# Patient Record
Sex: FEMALE | Race: WHITE | NOT HISPANIC OR LATINO | Employment: OTHER | URBAN - METROPOLITAN AREA
[De-identification: names, ages, dates, MRNs, and addresses within clinical notes are randomized per-mention and may not be internally consistent; named-entity substitution may affect disease eponyms.]

---

## 2019-12-01 ENCOUNTER — HOSPITAL ENCOUNTER (OUTPATIENT)
Facility: HOSPITAL | Age: 84
Setting detail: OBSERVATION
Discharge: HOME | End: 2019-12-03
Attending: EMERGENCY MEDICINE | Admitting: INTERNAL MEDICINE

## 2019-12-01 ENCOUNTER — APPOINTMENT (OUTPATIENT)
Dept: GENERAL RADIOLOGY | Facility: HOSPITAL | Age: 84
End: 2019-12-01

## 2019-12-01 ENCOUNTER — APPOINTMENT (OUTPATIENT)
Dept: CT IMAGING | Facility: HOSPITAL | Age: 84
End: 2019-12-01

## 2019-12-01 DIAGNOSIS — R53.1 WEAKNESS: ICD-10-CM

## 2019-12-01 DIAGNOSIS — R50.9 FEVER, UNSPECIFIED FEVER CAUSE: Primary | ICD-10-CM

## 2019-12-01 PROBLEM — E03.9 HYPOTHYROIDISM (ACQUIRED): Status: ACTIVE | Noted: 2019-12-01

## 2019-12-01 PROBLEM — R41.0 CONFUSION: Status: ACTIVE | Noted: 2019-12-01

## 2019-12-01 PROBLEM — I10 ESSENTIAL HYPERTENSION: Status: ACTIVE | Noted: 2019-12-01

## 2019-12-01 LAB
ALBUMIN SERPL-MCNC: 3.7 G/DL (ref 3.5–5.2)
ALBUMIN/GLOB SERPL: 0.9 G/DL
ALP SERPL-CCNC: 72 U/L (ref 39–117)
ALT SERPL W P-5'-P-CCNC: 9 U/L (ref 1–33)
ANION GAP SERPL CALCULATED.3IONS-SCNC: 14 MMOL/L (ref 5–15)
AST SERPL-CCNC: 30 U/L (ref 1–32)
BACTERIA UR QL AUTO: ABNORMAL /HPF
BASOPHILS # BLD AUTO: 0 10*3/MM3 (ref 0–0.2)
BASOPHILS NFR BLD AUTO: 0.3 % (ref 0–1.5)
BILIRUB SERPL-MCNC: 0.5 MG/DL (ref 0.2–1.2)
BILIRUB UR QL STRIP: NEGATIVE
BUN BLD-MCNC: 30 MG/DL (ref 8–23)
BUN/CREAT SERPL: 19.9 (ref 7–25)
CALCIUM SPEC-SCNC: 9.7 MG/DL (ref 8.2–9.6)
CHLORIDE SERPL-SCNC: 106 MMOL/L (ref 98–107)
CLARITY UR: CLEAR
CO2 SERPL-SCNC: 18 MMOL/L (ref 22–29)
COLOR UR: YELLOW
CREAT BLD-MCNC: 1.51 MG/DL (ref 0.57–1)
D-LACTATE SERPL-SCNC: 1.8 MMOL/L (ref 0.5–2)
DEPRECATED RDW RBC AUTO: 45.1 FL (ref 37–54)
EOSINOPHIL # BLD AUTO: 0 10*3/MM3 (ref 0–0.4)
EOSINOPHIL NFR BLD AUTO: 0.2 % (ref 0.3–6.2)
ERYTHROCYTE [DISTWIDTH] IN BLOOD BY AUTOMATED COUNT: 14.3 % (ref 12.3–15.4)
FLUAV SUBTYP SPEC NAA+PROBE: NOT DETECTED
FLUBV RNA ISLT QL NAA+PROBE: NOT DETECTED
GFR SERPL CREATININE-BSD FRML MDRD: 32 ML/MIN/1.73
GLOBULIN UR ELPH-MCNC: 4 GM/DL
GLUCOSE BLD-MCNC: 84 MG/DL (ref 65–99)
GLUCOSE UR STRIP-MCNC: NEGATIVE MG/DL
HCT VFR BLD AUTO: 33.1 % (ref 34–46.6)
HGB BLD-MCNC: 11 G/DL (ref 12–15.9)
HGB UR QL STRIP.AUTO: ABNORMAL
HYALINE CASTS UR QL AUTO: ABNORMAL /LPF
KETONES UR QL STRIP: ABNORMAL
LEUKOCYTE ESTERASE UR QL STRIP.AUTO: NEGATIVE
LIPASE SERPL-CCNC: 16 U/L (ref 13–60)
LYMPHOCYTES # BLD AUTO: 0.8 10*3/MM3 (ref 0.7–3.1)
LYMPHOCYTES NFR BLD AUTO: 19.8 % (ref 19.6–45.3)
MCH RBC QN AUTO: 29.1 PG (ref 26.6–33)
MCHC RBC AUTO-ENTMCNC: 33.2 G/DL (ref 31.5–35.7)
MCV RBC AUTO: 87.7 FL (ref 79–97)
MONOCYTES # BLD AUTO: 0.2 10*3/MM3 (ref 0.1–0.9)
MONOCYTES NFR BLD AUTO: 4.3 % (ref 5–12)
NEUTROPHILS # BLD AUTO: 3 10*3/MM3 (ref 1.7–7)
NEUTROPHILS NFR BLD AUTO: 75.4 % (ref 42.7–76)
NITRITE UR QL STRIP: NEGATIVE
NRBC BLD AUTO-RTO: 0 /100 WBC (ref 0–0.2)
PH UR STRIP.AUTO: 7 [PH] (ref 5–8)
PLATELET # BLD AUTO: 175 10*3/MM3 (ref 140–450)
PMV BLD AUTO: 8.8 FL (ref 6–12)
POTASSIUM BLD-SCNC: 4.6 MMOL/L (ref 3.5–5.2)
PROT SERPL-MCNC: 7.7 G/DL (ref 6–8.5)
PROT UR QL STRIP: ABNORMAL
RBC # BLD AUTO: 3.78 10*6/MM3 (ref 3.77–5.28)
RBC # UR: ABNORMAL /HPF
REF LAB TEST METHOD: ABNORMAL
SODIUM BLD-SCNC: 138 MMOL/L (ref 136–145)
SP GR UR STRIP: 1.02 (ref 1–1.03)
SQUAMOUS #/AREA URNS HPF: ABNORMAL /HPF
TROPONIN T SERPL-MCNC: 0.03 NG/ML (ref 0–0.03)
UROBILINOGEN UR QL STRIP: ABNORMAL
WBC NRBC COR # BLD: 4 10*3/MM3 (ref 3.4–10.8)
WBC UR QL AUTO: ABNORMAL /HPF

## 2019-12-01 PROCEDURE — P9612 CATHETERIZE FOR URINE SPEC: HCPCS

## 2019-12-01 PROCEDURE — 93005 ELECTROCARDIOGRAM TRACING: CPT | Performed by: EMERGENCY MEDICINE

## 2019-12-01 PROCEDURE — 83690 ASSAY OF LIPASE: CPT | Performed by: EMERGENCY MEDICINE

## 2019-12-01 PROCEDURE — 25010000002 MORPHINE PER 10 MG: Performed by: EMERGENCY MEDICINE

## 2019-12-01 PROCEDURE — 99284 EMERGENCY DEPT VISIT MOD MDM: CPT

## 2019-12-01 PROCEDURE — 81001 URINALYSIS AUTO W/SCOPE: CPT | Performed by: EMERGENCY MEDICINE

## 2019-12-01 PROCEDURE — 70450 CT HEAD/BRAIN W/O DYE: CPT

## 2019-12-01 PROCEDURE — 71045 X-RAY EXAM CHEST 1 VIEW: CPT

## 2019-12-01 PROCEDURE — 85025 COMPLETE CBC W/AUTO DIFF WBC: CPT | Performed by: EMERGENCY MEDICINE

## 2019-12-01 PROCEDURE — G0378 HOSPITAL OBSERVATION PER HR: HCPCS

## 2019-12-01 PROCEDURE — 84145 PROCALCITONIN (PCT): CPT | Performed by: PHYSICIAN ASSISTANT

## 2019-12-01 PROCEDURE — 99219 PR INITIAL OBSERVATION CARE/DAY 50 MINUTES: CPT | Performed by: PHYSICIAN ASSISTANT

## 2019-12-01 PROCEDURE — 36415 COLL VENOUS BLD VENIPUNCTURE: CPT

## 2019-12-01 PROCEDURE — 83605 ASSAY OF LACTIC ACID: CPT

## 2019-12-01 PROCEDURE — 83880 ASSAY OF NATRIURETIC PEPTIDE: CPT | Performed by: PHYSICIAN ASSISTANT

## 2019-12-01 PROCEDURE — 87040 BLOOD CULTURE FOR BACTERIA: CPT | Performed by: EMERGENCY MEDICINE

## 2019-12-01 PROCEDURE — 84484 ASSAY OF TROPONIN QUANT: CPT | Performed by: EMERGENCY MEDICINE

## 2019-12-01 PROCEDURE — 25010000002 ONDANSETRON PER 1 MG: Performed by: EMERGENCY MEDICINE

## 2019-12-01 PROCEDURE — 25010000002 CEFTRIAXONE PER 250 MG: Performed by: EMERGENCY MEDICINE

## 2019-12-01 PROCEDURE — 80053 COMPREHEN METABOLIC PANEL: CPT | Performed by: EMERGENCY MEDICINE

## 2019-12-01 PROCEDURE — 96375 TX/PRO/DX INJ NEW DRUG ADDON: CPT

## 2019-12-01 PROCEDURE — 84443 ASSAY THYROID STIM HORMONE: CPT | Performed by: PHYSICIAN ASSISTANT

## 2019-12-01 PROCEDURE — 87502 INFLUENZA DNA AMP PROBE: CPT | Performed by: EMERGENCY MEDICINE

## 2019-12-01 RX ORDER — MAGNESIUM SULFATE HEPTAHYDRATE 40 MG/ML
4 INJECTION, SOLUTION INTRAVENOUS AS NEEDED
Status: DISCONTINUED | OUTPATIENT
Start: 2019-12-01 | End: 2019-12-03 | Stop reason: HOSPADM

## 2019-12-01 RX ORDER — NIFEDIPINE 30 MG/1
30 TABLET, EXTENDED RELEASE ORAL DAILY
Status: CANCELLED | OUTPATIENT
Start: 2019-12-02

## 2019-12-01 RX ORDER — ASPIRIN 81 MG/1
81 TABLET ORAL DAILY
COMMUNITY

## 2019-12-01 RX ORDER — NITROGLYCERIN 0.4 MG/1
0.4 TABLET SUBLINGUAL
Status: DISCONTINUED | OUTPATIENT
Start: 2019-12-01 | End: 2019-12-03 | Stop reason: HOSPADM

## 2019-12-01 RX ORDER — POTASSIUM CHLORIDE 1.5 G/1.77G
40 POWDER, FOR SOLUTION ORAL AS NEEDED
Status: CANCELLED | OUTPATIENT
Start: 2019-12-01

## 2019-12-01 RX ORDER — ONDANSETRON 2 MG/ML
4 INJECTION INTRAMUSCULAR; INTRAVENOUS EVERY 6 HOURS PRN
Status: DISCONTINUED | OUTPATIENT
Start: 2019-12-01 | End: 2019-12-03 | Stop reason: HOSPADM

## 2019-12-01 RX ORDER — ACETAMINOPHEN 650 MG/1
650 SUPPOSITORY RECTAL EVERY 4 HOURS PRN
Status: CANCELLED | OUTPATIENT
Start: 2019-12-01

## 2019-12-01 RX ORDER — POTASSIUM CHLORIDE 20 MEQ/1
40 TABLET, EXTENDED RELEASE ORAL AS NEEDED
Status: CANCELLED | OUTPATIENT
Start: 2019-12-01

## 2019-12-01 RX ORDER — ONDANSETRON 2 MG/ML
4 INJECTION INTRAMUSCULAR; INTRAVENOUS EVERY 6 HOURS PRN
Status: CANCELLED | OUTPATIENT
Start: 2019-12-01

## 2019-12-01 RX ORDER — LEVOTHYROXINE SODIUM 0.07 MG/1
75 TABLET ORAL DAILY
COMMUNITY

## 2019-12-01 RX ORDER — MAGNESIUM SULFATE HEPTAHYDRATE 40 MG/ML
2 INJECTION, SOLUTION INTRAVENOUS AS NEEDED
Status: CANCELLED | OUTPATIENT
Start: 2019-12-01

## 2019-12-01 RX ORDER — ACETAMINOPHEN 160 MG/5ML
650 SOLUTION ORAL EVERY 4 HOURS PRN
Status: DISCONTINUED | OUTPATIENT
Start: 2019-12-01 | End: 2019-12-03 | Stop reason: HOSPADM

## 2019-12-01 RX ORDER — ASPIRIN 81 MG/1
81 TABLET ORAL DAILY
Status: DISCONTINUED | OUTPATIENT
Start: 2019-12-02 | End: 2019-12-03 | Stop reason: HOSPADM

## 2019-12-01 RX ORDER — DOCUSATE SODIUM 100 MG/1
100 CAPSULE, LIQUID FILLED ORAL 2 TIMES DAILY PRN
Status: DISCONTINUED | OUTPATIENT
Start: 2019-12-01 | End: 2019-12-03 | Stop reason: HOSPADM

## 2019-12-01 RX ORDER — SODIUM CHLORIDE 0.9 % (FLUSH) 0.9 %
10 SYRINGE (ML) INJECTION EVERY 12 HOURS SCHEDULED
Status: DISCONTINUED | OUTPATIENT
Start: 2019-12-01 | End: 2019-12-03 | Stop reason: HOSPADM

## 2019-12-01 RX ORDER — NICOTINE 21 MG/24HR
1 PATCH, TRANSDERMAL 24 HOURS TRANSDERMAL EVERY 24 HOURS
Status: CANCELLED | OUTPATIENT
Start: 2019-12-01

## 2019-12-01 RX ORDER — ONDANSETRON 4 MG/1
4 TABLET, FILM COATED ORAL EVERY 6 HOURS PRN
Status: DISCONTINUED | OUTPATIENT
Start: 2019-12-01 | End: 2019-12-03 | Stop reason: HOSPADM

## 2019-12-01 RX ORDER — CHOLECALCIFEROL (VITAMIN D3) 125 MCG
5 CAPSULE ORAL NIGHTLY PRN
Status: CANCELLED | OUTPATIENT
Start: 2019-12-01

## 2019-12-01 RX ORDER — ONDANSETRON 4 MG/1
4 TABLET, FILM COATED ORAL EVERY 6 HOURS PRN
Status: CANCELLED | OUTPATIENT
Start: 2019-12-01

## 2019-12-01 RX ORDER — DOCUSATE SODIUM 100 MG/1
100 CAPSULE, LIQUID FILLED ORAL 2 TIMES DAILY PRN
Status: CANCELLED | OUTPATIENT
Start: 2019-12-01

## 2019-12-01 RX ORDER — POTASSIUM CHLORIDE 20 MEQ/1
40 TABLET, EXTENDED RELEASE ORAL AS NEEDED
Status: DISCONTINUED | OUTPATIENT
Start: 2019-12-01 | End: 2019-12-03 | Stop reason: HOSPADM

## 2019-12-01 RX ORDER — MAGNESIUM SULFATE HEPTAHYDRATE 40 MG/ML
2 INJECTION, SOLUTION INTRAVENOUS AS NEEDED
Status: DISCONTINUED | OUTPATIENT
Start: 2019-12-01 | End: 2019-12-03 | Stop reason: HOSPADM

## 2019-12-01 RX ORDER — ASPIRIN 81 MG/1
81 TABLET ORAL DAILY
Status: CANCELLED | OUTPATIENT
Start: 2019-12-02

## 2019-12-01 RX ORDER — ONDANSETRON 2 MG/ML
4 INJECTION INTRAMUSCULAR; INTRAVENOUS ONCE
Status: COMPLETED | OUTPATIENT
Start: 2019-12-01 | End: 2019-12-01

## 2019-12-01 RX ORDER — SODIUM CHLORIDE 0.9 % (FLUSH) 0.9 %
10 SYRINGE (ML) INJECTION EVERY 12 HOURS SCHEDULED
Status: CANCELLED | OUTPATIENT
Start: 2019-12-01

## 2019-12-01 RX ORDER — ACETAMINOPHEN 160 MG/5ML
650 SOLUTION ORAL EVERY 4 HOURS PRN
Status: CANCELLED | OUTPATIENT
Start: 2019-12-01

## 2019-12-01 RX ORDER — MAGNESIUM SULFATE HEPTAHYDRATE 40 MG/ML
4 INJECTION, SOLUTION INTRAVENOUS AS NEEDED
Status: CANCELLED | OUTPATIENT
Start: 2019-12-01

## 2019-12-01 RX ORDER — NIFEDIPINE 30 MG/1
30 TABLET, EXTENDED RELEASE ORAL DAILY
COMMUNITY

## 2019-12-01 RX ORDER — ACETAMINOPHEN 325 MG/1
650 TABLET ORAL EVERY 4 HOURS PRN
Status: DISCONTINUED | OUTPATIENT
Start: 2019-12-01 | End: 2019-12-03 | Stop reason: HOSPADM

## 2019-12-01 RX ORDER — ACETAMINOPHEN 650 MG/1
650 SUPPOSITORY RECTAL EVERY 4 HOURS PRN
Status: DISCONTINUED | OUTPATIENT
Start: 2019-12-01 | End: 2019-12-03 | Stop reason: HOSPADM

## 2019-12-01 RX ORDER — CHOLECALCIFEROL (VITAMIN D3) 125 MCG
5 CAPSULE ORAL NIGHTLY PRN
Status: DISCONTINUED | OUTPATIENT
Start: 2019-12-01 | End: 2019-12-03 | Stop reason: HOSPADM

## 2019-12-01 RX ORDER — POTASSIUM CHLORIDE 1.5 G/1.77G
40 POWDER, FOR SOLUTION ORAL AS NEEDED
Status: DISCONTINUED | OUTPATIENT
Start: 2019-12-01 | End: 2019-12-03 | Stop reason: HOSPADM

## 2019-12-01 RX ORDER — NITROGLYCERIN 0.4 MG/1
0.4 TABLET SUBLINGUAL
Status: CANCELLED | OUTPATIENT
Start: 2019-12-01

## 2019-12-01 RX ORDER — CALCIUM GLUCONATE 20 MG/ML
2 INJECTION, SOLUTION INTRAVENOUS AS NEEDED
Status: CANCELLED | OUTPATIENT
Start: 2019-12-01

## 2019-12-01 RX ORDER — CALCIUM GLUCONATE 20 MG/ML
2 INJECTION, SOLUTION INTRAVENOUS AS NEEDED
Status: DISCONTINUED | OUTPATIENT
Start: 2019-12-01 | End: 2019-12-03 | Stop reason: HOSPADM

## 2019-12-01 RX ORDER — ACETAMINOPHEN 325 MG/1
650 TABLET ORAL EVERY 4 HOURS PRN
Status: CANCELLED | OUTPATIENT
Start: 2019-12-01

## 2019-12-01 RX ORDER — SODIUM CHLORIDE 0.9 % (FLUSH) 0.9 %
10 SYRINGE (ML) INJECTION AS NEEDED
Status: CANCELLED | OUTPATIENT
Start: 2019-12-01

## 2019-12-01 RX ORDER — SODIUM CHLORIDE 0.9 % (FLUSH) 0.9 %
10 SYRINGE (ML) INJECTION AS NEEDED
Status: DISCONTINUED | OUTPATIENT
Start: 2019-12-01 | End: 2019-12-03 | Stop reason: HOSPADM

## 2019-12-01 RX ORDER — NIFEDIPINE 30 MG/1
30 TABLET, EXTENDED RELEASE ORAL DAILY
Status: DISCONTINUED | OUTPATIENT
Start: 2019-12-02 | End: 2019-12-03 | Stop reason: HOSPADM

## 2019-12-01 RX ORDER — CALCIUM GLUCONATE 20 MG/ML
1 INJECTION, SOLUTION INTRAVENOUS AS NEEDED
Status: DISCONTINUED | OUTPATIENT
Start: 2019-12-01 | End: 2019-12-03 | Stop reason: HOSPADM

## 2019-12-01 RX ORDER — LEVOTHYROXINE SODIUM 0.07 MG/1
75 TABLET ORAL
Status: DISCONTINUED | OUTPATIENT
Start: 2019-12-02 | End: 2019-12-03 | Stop reason: HOSPADM

## 2019-12-01 RX ORDER — NICOTINE 21 MG/24HR
1 PATCH, TRANSDERMAL 24 HOURS TRANSDERMAL EVERY 24 HOURS
Status: DISCONTINUED | OUTPATIENT
Start: 2019-12-02 | End: 2019-12-03 | Stop reason: HOSPADM

## 2019-12-01 RX ORDER — CALCIUM GLUCONATE 20 MG/ML
1 INJECTION, SOLUTION INTRAVENOUS AS NEEDED
Status: CANCELLED | OUTPATIENT
Start: 2019-12-01

## 2019-12-01 RX ORDER — LEVOTHYROXINE SODIUM 0.07 MG/1
75 TABLET ORAL DAILY
Status: CANCELLED | OUTPATIENT
Start: 2019-12-02

## 2019-12-01 RX ORDER — MORPHINE SULFATE 4 MG/ML
2 INJECTION, SOLUTION INTRAMUSCULAR; INTRAVENOUS ONCE
Status: COMPLETED | OUTPATIENT
Start: 2019-12-01 | End: 2019-12-01

## 2019-12-01 RX ADMIN — SODIUM CHLORIDE 500 ML: 900 INJECTION, SOLUTION INTRAVENOUS at 20:22

## 2019-12-01 RX ADMIN — ONDANSETRON 4 MG: 2 INJECTION INTRAMUSCULAR; INTRAVENOUS at 20:48

## 2019-12-01 RX ADMIN — CEFTRIAXONE SODIUM 1 G: 10 INJECTION, POWDER, FOR SOLUTION INTRAVENOUS at 20:49

## 2019-12-01 RX ADMIN — MORPHINE SULFATE 2 MG: 4 INJECTION INTRAVENOUS at 20:49

## 2019-12-02 PROBLEM — N17.9 ACUTE KIDNEY INJURY (HCC): Status: ACTIVE | Noted: 2019-12-02

## 2019-12-02 LAB
ALBUMIN SERPL-MCNC: 3.1 G/DL (ref 3.5–5.2)
ALBUMIN/GLOB SERPL: 0.9 G/DL
ALP SERPL-CCNC: 59 U/L (ref 39–117)
ALT SERPL W P-5'-P-CCNC: 8 U/L (ref 1–33)
ANION GAP SERPL CALCULATED.3IONS-SCNC: 7 MMOL/L (ref 5–15)
AST SERPL-CCNC: 26 U/L (ref 1–32)
B PERT DNA SPEC QL NAA+PROBE: NOT DETECTED
BASOPHILS # BLD AUTO: 0 10*3/MM3 (ref 0–0.2)
BASOPHILS NFR BLD AUTO: 0.4 % (ref 0–1.5)
BILIRUB SERPL-MCNC: 0.2 MG/DL (ref 0.2–1.2)
BUN BLD-MCNC: 28 MG/DL (ref 8–23)
BUN/CREAT SERPL: 19.9 (ref 7–25)
C PNEUM DNA NPH QL NAA+NON-PROBE: NOT DETECTED
CALCIUM SPEC-SCNC: 9.1 MG/DL (ref 8.2–9.6)
CHLORIDE SERPL-SCNC: 111 MMOL/L (ref 98–107)
CHOLEST SERPL-MCNC: 145 MG/DL (ref 0–200)
CK SERPL-CCNC: 37 U/L (ref 20–180)
CO2 SERPL-SCNC: 22 MMOL/L (ref 22–29)
CREAT BLD-MCNC: 1.41 MG/DL (ref 0.57–1)
DEPRECATED RDW RBC AUTO: 46.8 FL (ref 37–54)
EOSINOPHIL # BLD AUTO: 0 10*3/MM3 (ref 0–0.4)
EOSINOPHIL NFR BLD AUTO: 0.7 % (ref 0.3–6.2)
ERYTHROCYTE [DISTWIDTH] IN BLOOD BY AUTOMATED COUNT: 14.7 % (ref 12.3–15.4)
FERRITIN SERPL-MCNC: 145.4 NG/ML (ref 13–150)
FLUAV H1 2009 PAND RNA NPH QL NAA+PROBE: NOT DETECTED
FLUAV H1 HA GENE NPH QL NAA+PROBE: NOT DETECTED
FLUAV H3 RNA NPH QL NAA+PROBE: NOT DETECTED
FLUAV SUBTYP SPEC NAA+PROBE: NOT DETECTED
FLUBV RNA ISLT QL NAA+PROBE: NOT DETECTED
GFR SERPL CREATININE-BSD FRML MDRD: 34 ML/MIN/1.73
GLOBULIN UR ELPH-MCNC: 3.3 GM/DL
GLUCOSE BLD-MCNC: 74 MG/DL (ref 65–99)
HADV DNA SPEC NAA+PROBE: NOT DETECTED
HBA1C MFR BLD: 4.7 % (ref 3.5–5.6)
HCOV 229E RNA SPEC QL NAA+PROBE: NOT DETECTED
HCOV HKU1 RNA SPEC QL NAA+PROBE: NOT DETECTED
HCOV NL63 RNA SPEC QL NAA+PROBE: NOT DETECTED
HCOV OC43 RNA SPEC QL NAA+PROBE: NOT DETECTED
HCT VFR BLD AUTO: 29.7 % (ref 34–46.6)
HDLC SERPL-MCNC: 32 MG/DL (ref 40–60)
HGB BLD-MCNC: 9.8 G/DL (ref 12–15.9)
HMPV RNA NPH QL NAA+NON-PROBE: NOT DETECTED
HPIV1 RNA SPEC QL NAA+PROBE: NOT DETECTED
HPIV2 RNA SPEC QL NAA+PROBE: NOT DETECTED
HPIV3 RNA NPH QL NAA+PROBE: NOT DETECTED
HPIV4 P GENE NPH QL NAA+PROBE: NOT DETECTED
LDLC SERPL CALC-MCNC: 96 MG/DL (ref 0–100)
LDLC/HDLC SERPL: 3.01 {RATIO}
LYMPHOCYTES # BLD AUTO: 0.7 10*3/MM3 (ref 0.7–3.1)
LYMPHOCYTES NFR BLD AUTO: 22.1 % (ref 19.6–45.3)
M PNEUMO IGG SER IA-ACNC: NOT DETECTED
MAGNESIUM SERPL-MCNC: 2 MG/DL (ref 1.7–2.3)
MCH RBC QN AUTO: 29.5 PG (ref 26.6–33)
MCHC RBC AUTO-ENTMCNC: 33 G/DL (ref 31.5–35.7)
MCV RBC AUTO: 89.3 FL (ref 79–97)
MONOCYTES # BLD AUTO: 0.2 10*3/MM3 (ref 0.1–0.9)
MONOCYTES NFR BLD AUTO: 7.6 % (ref 5–12)
NEUTROPHILS # BLD AUTO: 2.1 10*3/MM3 (ref 1.7–7)
NEUTROPHILS NFR BLD AUTO: 69.2 % (ref 42.7–76)
NRBC BLD AUTO-RTO: 0 /100 WBC (ref 0–0.2)
NT-PROBNP SERPL-MCNC: 8959 PG/ML (ref 5–1800)
PHOSPHATE SERPL-MCNC: 3.3 MG/DL (ref 2.5–4.5)
PLATELET # BLD AUTO: 143 10*3/MM3 (ref 140–450)
PMV BLD AUTO: 8.7 FL (ref 6–12)
POTASSIUM BLD-SCNC: 4.1 MMOL/L (ref 3.5–5.2)
PROCALCITONIN SERPL-MCNC: 0.11 NG/ML (ref 0.1–0.25)
PROT SERPL-MCNC: 6.4 G/DL (ref 6–8.5)
RBC # BLD AUTO: 3.33 10*6/MM3 (ref 3.77–5.28)
RHINOVIRUS RNA SPEC NAA+PROBE: NOT DETECTED
RSV RNA NPH QL NAA+NON-PROBE: NOT DETECTED
SODIUM BLD-SCNC: 140 MMOL/L (ref 136–145)
TRIGL SERPL-MCNC: 84 MG/DL (ref 0–150)
TROPONIN T SERPL-MCNC: 0.03 NG/ML (ref 0–0.03)
TSH SERPL DL<=0.05 MIU/L-ACNC: 0.21 UIU/ML (ref 0.27–4.2)
VLDLC SERPL-MCNC: 16.8 MG/DL
WBC NRBC COR # BLD: 3 10*3/MM3 (ref 3.4–10.8)

## 2019-12-02 PROCEDURE — 84100 ASSAY OF PHOSPHORUS: CPT | Performed by: PHYSICIAN ASSISTANT

## 2019-12-02 PROCEDURE — 97165 OT EVAL LOW COMPLEX 30 MIN: CPT

## 2019-12-02 PROCEDURE — 25010000002 CEFTRIAXONE PER 250 MG: Performed by: PHYSICIAN ASSISTANT

## 2019-12-02 PROCEDURE — 96365 THER/PROPH/DIAG IV INF INIT: CPT

## 2019-12-02 PROCEDURE — 0099U HC BIOFIRE FILMARRAY RESP PANEL 1: CPT | Performed by: PHYSICIAN ASSISTANT

## 2019-12-02 PROCEDURE — 80053 COMPREHEN METABOLIC PANEL: CPT | Performed by: PHYSICIAN ASSISTANT

## 2019-12-02 PROCEDURE — 96372 THER/PROPH/DIAG INJ SC/IM: CPT

## 2019-12-02 PROCEDURE — 80061 LIPID PANEL: CPT | Performed by: PHYSICIAN ASSISTANT

## 2019-12-02 PROCEDURE — 83036 HEMOGLOBIN GLYCOSYLATED A1C: CPT | Performed by: PHYSICIAN ASSISTANT

## 2019-12-02 PROCEDURE — 84484 ASSAY OF TROPONIN QUANT: CPT | Performed by: PHYSICIAN ASSISTANT

## 2019-12-02 PROCEDURE — 85025 COMPLETE CBC W/AUTO DIFF WBC: CPT | Performed by: PHYSICIAN ASSISTANT

## 2019-12-02 PROCEDURE — G0378 HOSPITAL OBSERVATION PER HR: HCPCS

## 2019-12-02 PROCEDURE — 99225 PR SBSQ OBSERVATION CARE/DAY 25 MINUTES: CPT | Performed by: INTERNAL MEDICINE

## 2019-12-02 PROCEDURE — 82550 ASSAY OF CK (CPK): CPT | Performed by: PHYSICIAN ASSISTANT

## 2019-12-02 PROCEDURE — 82728 ASSAY OF FERRITIN: CPT | Performed by: PHYSICIAN ASSISTANT

## 2019-12-02 PROCEDURE — 25010000002 ENOXAPARIN PER 10 MG: Performed by: PHYSICIAN ASSISTANT

## 2019-12-02 PROCEDURE — 83735 ASSAY OF MAGNESIUM: CPT | Performed by: PHYSICIAN ASSISTANT

## 2019-12-02 PROCEDURE — 97530 THERAPEUTIC ACTIVITIES: CPT

## 2019-12-02 RX ADMIN — LEVOTHYROXINE SODIUM 75 MCG: 75 TABLET ORAL at 05:58

## 2019-12-02 RX ADMIN — CEFTRIAXONE SODIUM 1 G: 1 INJECTION, POWDER, FOR SOLUTION INTRAMUSCULAR; INTRAVENOUS at 21:15

## 2019-12-02 RX ADMIN — ENOXAPARIN SODIUM 30 MG: 30 INJECTION SUBCUTANEOUS at 15:34

## 2019-12-02 RX ADMIN — NICOTINE 1 PATCH: 21 PATCH TRANSDERMAL at 08:14

## 2019-12-02 RX ADMIN — Medication 10 ML: at 22:00

## 2019-12-02 RX ADMIN — NIFEDIPINE 30 MG: 30 TABLET, FILM COATED, EXTENDED RELEASE ORAL at 08:14

## 2019-12-02 RX ADMIN — ASPIRIN 81 MG: 81 TABLET, DELAYED RELEASE ORAL at 08:14

## 2019-12-02 RX ADMIN — Medication 10 ML: at 08:13

## 2019-12-02 RX ADMIN — Medication 10 ML: at 00:17

## 2019-12-02 NOTE — PLAN OF CARE
Problem: Patient Care Overview  Goal: Plan of Care Review  Outcome: Ongoing (interventions implemented as appropriate)   12/02/19 5083   Coping/Psychosocial   Plan of Care Reviewed With patient   Plan of Care Review   Progress improving   OTHER   Outcome Summary Pt. had no fever once transferred to floor, no c/o pain, pt. is confused but unable to confirm whether this is baseline for her given her age, CT head ordered, will continue to monitor

## 2019-12-02 NOTE — ASSESSMENT & PLAN NOTE
Patient's blood pressure moderately controlled with a current pressure of 154/78  -Continue nifedipine  -Monitor while admitted

## 2019-12-02 NOTE — ASSESSMENT & PLAN NOTE
Patient noted in the ED of having a fever of 100.2, WBCs: 4.0  -Rocephin given in the ED for fever of unknown origin  -Lactate: 1.8  -Procalcitonin 0.11  -all work-up negative to date  -no recurrence of elevated temp since admit; d/c antibiotics and observe overnight

## 2019-12-02 NOTE — PLAN OF CARE
Problem: Patient Care Overview  Goal: Plan of Care Review  Outcome: Ongoing (interventions implemented as appropriate)    Goal: Discharge Needs Assessment  Outcome: Ongoing (interventions implemented as appropriate)    Goal: Interprofessional Rounds/Family Conf  Outcome: Ongoing (interventions implemented as appropriate)      Problem: Fall Risk (Adult)  Goal: Absence of Fall  Outcome: Outcome(s) achieved Date Met: 12/02/19      Problem: Skin Injury Risk (Adult)  Goal: Skin Health and Integrity  Outcome: Ongoing (interventions implemented as appropriate)      Problem: Pain, Chronic (Adult)  Goal: Acceptable Pain/Comfort Level and Functional Ability  Outcome: Outcome(s) achieved Date Met: 12/02/19      Problem: Activity Intolerance (Adult)  Goal: Activity Tolerance  Outcome: Ongoing (interventions implemented as appropriate)      Problem: Infection, Risk/Actual (Adult)  Goal: Infection Prevention/Resolution  Outcome: Ongoing (interventions implemented as appropriate)

## 2019-12-02 NOTE — PROGRESS NOTES
Discharge Planning Assessment   Kris     Patient Name: Mariana Justice  MRN: [id number]  Today's Date: 12/2/2019    Admit Date: 12/1/2019    Discharge Needs Assessment     Row Name 12/02/19 1523       Living Environment    Lives With  child(bud), adult    Current Living Arrangements  home/apartment/condo    Primary Care Provided by  self    Provides Primary Care For  no one, unable/limited ability to care for self    Family Caregiver if Needed  child(bud), adult    Quality of Family Relationships  helpful;supportive    Able to Return to Prior Arrangements  yes       Resource/Environmental Concerns    Resource/Environmental Concerns  none    Transportation Concerns  rides, unreliable from others;other (see comments) she does not drive, son drives but does not have a car. updated Kerline HOYT.        Transition Planning    Patient/Family Anticipates Transition to  home with family;home with help/services    Patient/Family Anticipated Services at Transition  home health care    Transportation Anticipated  family or friend will provide;other (see comments) may have transporation issues on discharge, updated Kerline HOYT.        Discharge Needs Assessment    Readmission Within the Last 30 Days  no previous admission in last 30 days    Concerns to be Addressed  discharge planning    Equipment Currently Used at Home  walker, rolling;cane, straight;shower chair;commode;rollator    Anticipated Changes Related to Illness  inability to care for self        Discharge Plan     Row Name 12/02/19 1524       Plan    Plan  Pending PT/OT callum. Lives at home with son.     Patient/Family in Agreement with Plan  yes    Plan Comments  Met with patient at bedside. She lives at home and her son lives with her. She is normally independent with most ADLs per patient report. Patient stated her son does the cooking, house keeping, and grocery shopping. Patient does not drive, her son drives but he currently does not have a car. She could not  tell me how she has gotten to a doctors appt in the past because it had been so long. Her son walks to the grocery store and pushes a cart. Updated MSW that there may be an issue with transporation on discharge home. DC barriers: IV abx, pending cultures.              Expected Discharge Date and Time     Expected Discharge Date Expected Discharge Time    Dec 3, 2019         Demographic Summary     Row Name 12/02/19 1522       General Information    Admission Type  observation    Arrived From  emergency department    Required Notices Provided  Observation Status Notice    Referral Source  admission list    Reason for Consult  discharge planning    Preferred Language  English     Used During This Interaction  no        Functional Status     Row Name 12/02/19 1523       Functional Status    Usual Activity Tolerance  moderate    Current Activity Tolerance  moderate       Functional Status, IADL    Medications  independent    Meal Preparation  assistive person    Housekeeping  assistive person    Laundry  assistive person    Shopping  assistive person       Mental Status    General Appearance WDL  WDL       Mental Status Summary    Recent Changes in Mental Status/Cognitive Functioning  no changes          Patient Forms     Row Name 12/02/19 1528       Patient Forms    Important Message from Medicare (IMM)  -- Muñoz delivered 12/2             Karen Dooley RN

## 2019-12-02 NOTE — H&P
[facility]      Patient Name: Mariana Justice  : 1919  MRN: [id number]  Primary Care Physician: Richard Cabello MD  Date of admission: 2019    Patient Care Team:  Richard Cabello MD as PCP - General (Internal Medicine)          Subjective   History Present Illness     Chief Complaint:   Chief Complaint   Patient presents with   • Weakness - Generalized       Ms. Justice is a 89 y.o. female with past medical history of hypothyroidism and hypertension who presents to [facility] with confusion.  At time of my exam patient is alert and oriented to person and place however she is not able to provide any significant ROS, or HPI.  When asked because she is feeling she says fine, denies any pain, shortness of breath, nausea, vomiting.  RN states at various times since her visit she has said both her stomach and her back hurt.  Phone number on file for patient's son who is her POA w was not working.  Also unable to contact son via phone number provided by the 911 dispatch center who took the call for service from the ambulance.  I was able to speak with the EMTs who transported patient to the hospital they state patient's son called them because patient had become acutely confused today.  They note he was afraid that his mother had a UTI and wanted her seen at the hospital.    Patient was noted with a temperature of 100.2 on admission.  UA shows trace ketones, 1+ blood, negative nitrites or leukocyte esterase, 1+ protein, 6-12 RBCs, no bacteria and no squamous epithelial cells.  Flu screen was negative.  Troponin: 0.034, lactate: 1.8, lipase: 16 chest x-ray reported by the ED provider is negative.    EKG shows sinus rhythm at 75 with an IVCD consistent with a right bundle branch block and a slightly increased QTC.  Blood cultures are pending         History of Present Illness    Review of Systems   Unable to perform ROS: mental status change           Personal  History     Past Medical History: History reviewed. No pertinent past medical history.    Surgical History:    No past surgical history on file.        Family History: family history is not on file. Otherwise pertinent FHx was reviewed and unremarkable.     Social History:        Medications:  Prior to Admission medications    Medication Sig Start Date End Date Taking? Authorizing Provider   aspirin 81 MG EC tablet Take 81 mg by mouth Daily.   Yes Perla Francois MD   levothyroxine (SYNTHROID, LEVOTHROID) 75 MCG tablet Take 75 mcg by mouth Daily.   Yes Perla Francois MD   NIFEdipine XL (PROCARDIA XL) 30 MG 24 hr tablet Take 30 mg by mouth Daily.   Yes Provider, MD Perla       Allergies:  No Known Allergies    Objective   Objective     Vital Signs  Temp:  [97.7 °F (36.5 °C)-100.2 °F (37.9 °C)] 97.8 °F (36.6 °C)  Heart Rate:  [70-80] 70  Resp:  [17-19] 17  BP: (131-155)/(77-90) 131/79  SpO2:  [93 %-100 %] 93 %  on   ;   Device (Oxygen Therapy): room air  Body mass index is 24.72 kg/m².    Physical Exam   Constitutional: She appears well-developed and well-nourished.   HENT:   Head: Normocephalic and atraumatic.   Eyes: EOM are normal. Pupils are equal, round, and reactive to light.   Neck: Normal range of motion. Neck supple.   Cardiovascular: Normal rate, regular rhythm, normal heart sounds and intact distal pulses.   Pulmonary/Chest: Effort normal and breath sounds normal.   Abdominal: Soft. Bowel sounds are normal.   Musculoskeletal: Normal range of motion.   Neurological: She is alert.   Skin: Skin is warm and dry.   Psychiatric: She has a normal mood and affect. Her behavior is normal. Judgment and thought content normal.       Results Review:  I have personally reviewed most recent cardiac tracings, lab results and radiology images and interpretations and agree with findings, most notably: UA, troponin, EKG and chest x-ray.    Results from last 7 days   Lab Units 12/01/19 1953   WBC 10*3/mm3  4.00   HEMOGLOBIN g/dL 11.0*   HEMATOCRIT % 33.1*   PLATELETS 10*3/mm3 175     Results from last 7 days   Lab Units 12/01/19 1958 12/01/19 1954   SODIUM mmol/L  --  138   POTASSIUM mmol/L  --  4.6   CHLORIDE mmol/L  --  106   CO2 mmol/L  --  18.0*   BUN mg/dL  --  30*   CREATININE mg/dL  --  1.51*   GLUCOSE mg/dL  --  84   CALCIUM mg/dL  --  9.7*   ALT (SGPT) U/L  --  9   AST (SGOT) U/L  --  30   TROPONIN T ng/mL  --  0.034*   PROBNP pg/mL  --  8,959.0*   LACTATE mmol/L 1.8  --    PROCALCITONIN ng/mL  --  0.11     Estimated Creatinine Clearance: 17.1 mL/min (A) (by C-G formula based on SCr of 1.51 mg/dL (H)).  Brief Urine Lab Results  (Last result in the past 365 days)      Color   Clarity   Blood   Leuk Est   Nitrite   Protein   CREAT   Urine HCG        12/01/19 2018 Yellow Clear Small (1+) Negative Negative 30 mg/dL (1+)               Microbiology Results (last 10 days)     Procedure Component Value - Date/Time    Influenza Antigen, Rapid - Swab, Nasopharynx [[id number]]  (Normal) Collected:  12/01/19 2020    Lab Status:  Final result Specimen:  Swab from Nasopharynx Updated:  12/01/19 2042     Influenza A PCR Not Detected     Influenza B PCR Not Detected          ECG/EMG Results (most recent)     Procedure Component Value Units Date/Time    ECG 12 Lead [[id number]] Collected:  12/01/19 1942     Updated:  12/01/19 1944    Narrative:       HEART RATE= 75  bpm  RR Interval= 804  ms  MO Interval= 162  ms  P Horizontal Axis= -62  deg  P Front Axis= 80  deg  QRSD Interval= 140  ms  QT Interval= 445  ms  QRS Axis= -59  deg  T Wave Axis= -5  deg  - BORDERLINE ECG -  Sinus rhythm  Probable left atrial enlargement  When compared with ECG of 16-Sep-2015 15:16:21,  No significant change  Electronically Signed By:   Date and Time of Study: 2019-12-01 19:42:15                    Ct Head Without Contrast    Result Date: 12/1/2019   1.  No acute intracranial process detected. 2.  Volume loss and mild microvascular ischemic  changes.   Last Mcintyre MD Neuroradiologist Thank you for this referral.  This exam was interpreted by a fellowship trained neuroradiologist.   SLOT:  68  Electronically signed by:  Last Mcintyre  12/1/2019 10:01 PM        Estimated Creatinine Clearance: 17.1 mL/min (A) (by C-G formula based on SCr of 1.51 mg/dL (H)).    Assessment/Plan   Assessment/Plan       Active Hospital Problems:  Confusion- (present on admission)    Patient reported by EMS crew who brought patient to the hospital as having an acute change in mental status.  They state son notes patient is typically A&O x1 and lives independently.  They state symptoms have occurred over the past 24 hours.  -CT head without contrast  -Troponin noted is elevated, trend  -UA and chest x-ray negative as a source of infection  -Check TSH and A1c  -Continue cardiac monitoring     Fever- (present on admission)     Patient noted in the ED of having a fever of 100.2, WBCs: 4.0  -Rocephin given in the ED for fever of unknown origin, continue  -Lactate: 1.8  -Check procalcitonin  -Monitor while admitted     Acute kidney injury (CMS/HCC)    Pt noted with an elevated creatinine of 1.51 with a BUN of 30 and a BUN:CR of 19.9.  No baseline labs are available for comparison.  BNP 8959.0.  Pt denies any changes in urine output  -Avoid nephrotoxic medication and contrast  -monitor while admitted     Essential hypertension- (present on admission)     Patient's blood pressure moderately controlled with a current pressure of 154/78  -Continue nifedipine  -Monitor while admitted     Hypothyroidism (acquired)- (present on admission)    Patient has a history of hypothyroidism along with a change in mental status  -Check TSH  -Continue levothyroxine                  VTE Prophylaxis - SCDs.    CODE STATUS:    Code Status and Medical Interventions:   Ordered at: 12/01/19 8953     Level Of Support Discussed With:    Patient     Code Status:    CPR     Medical Interventions (Level of  Support Prior to Arrest):    Full       Admission Status:  I believe this patient meets observation criteria.      I discussed the patients findings and my recommendations with nursing staff.        Electronically signed by Meet Simms PA-C, 12/01/19, 9:22 PM.  [facility] Hospitalist Team

## 2019-12-02 NOTE — PLAN OF CARE
Problem: Patient Care Overview  Goal: Plan of Care Review   12/02/19 1509   OTHER   Outcome Summary pt alert and oriented to person, place and situation today. she does show some increased weakness and balance issues, requiring up to mod a for lb adls and min a for basic transfers/mobility. pt also has severe vision deficits, combined w/ current decline, she is at an increased risk for falls/injury. pt would benefit from a short ip rehab stay but states she wants to go home w/ some HH therapy and reports her son can assist her as needed 24/7. if pt returns home, would recommend a tub transfer bench. will follow 5x/wk during stay at [facility].

## 2019-12-02 NOTE — ASSESSMENT & PLAN NOTE
Patient reported by EMS crew who brought patient to the hospital as having an acute change in mental status.  They state son notes patient is typically A&O x1 and lives independently.  They state symptoms have occurred over the past 24 hours.  -CT head without contrast  -Troponin noted is elevated, trend  -UA and chest x-ray negative as a source of infection  -Check TSH and A1c  -Continue cardiac monitoring

## 2019-12-02 NOTE — PROGRESS NOTES
[facility] Medicine Services Daily Progress Note      Hospitalist Team  LOS 0 days      Patient Care Team:  Richard Cabello MD as PCP - General (Internal Medicine)    Patient Location: [facility]/1      Subjective   Subjective     Chief Complaint / Subjective  Chief Complaint   Patient presents with   • Weakness - Generalized       Present on Admission:  • Fever  • Confusion  • Hypothyroidism (acquired)  • Essential hypertension      Brief Synopsis of Hospital Course/HPI  Ms. Justice is a 89 y.o. female with past medical history of hypothyroidism and hypertension who presents to [facility] with confusion.  At time of my exam patient is alert and oriented to person and place however she is not able to provide any significant ROS, or HPI.  When asked because she is feeling she says fine, denies any pain, shortness of breath, nausea, vomiting.  RN states at various times since her visit she has said both her stomach and her back hurt.  Phone number on file for patient's son who is her POA w was not working.  Also unable to contact son via phone number provided by the 911 dispatch center who took the call for service from the ambulance.  I was able to speak with the EMTs who transported patient to the hospital they state patient's son called them because patient had become acutely confused today.  They note he was afraid that his mother had a UTI and wanted her seen at the hospital.     Patient was noted with a temperature of 100.2 on admission.  UA shows trace ketones, 1+ blood, negative nitrites or leukocyte esterase, 1+ protein, 6-12 RBCs, no bacteria and no squamous epithelial cells.  Flu screen was negative.  Troponin: 0.034, lactate: 1.8, lipase: 16 chest x-ray reported by the ED provider is negative.    EKG shows sinus rhythm at 75 with an IVCD consistent with a right bundle branch block and a slightly increased QTC.  Blood cultures are pending      Date::    12/2/2019 Pt is feeling  "much better, back to baseline.  She has had no further fever.  She states she was having some diarrhea for 2 days, but that is also improving.      Review of Systems   Constitution: Positive for fever, weakness and malaise/fatigue.   Gastrointestinal: Positive for diarrhea and hematochezia. Negative for abdominal pain, melena, nausea and vomiting.   Psychiatric/Behavioral: Positive for altered mental status.   All other systems reviewed and are negative.        Objective   Objective      Vital Signs  Temp:  [97.3 °F (36.3 °C)-100.2 °F (37.9 °C)] 97.5 °F (36.4 °C)  Heart Rate:  [] 105  Resp:  [16-19] 16  BP: (100-155)/(52-90) 100/60  Oxygen Therapy  SpO2: 97 %  Pulse Oximetry Type: Intermittent  Device (Oxygen Therapy): room air  Flowsheet Rows      First Filed Value   Admission Height  157.5 cm (62\") Documented at 12/01/2019 1921   Admission Weight  59 kg (130 lb) Documented at 12/01/2019 1921        Intake & Output (last 3 days)       11/29 0701 - 11/30 0700 11/30 0701 - 12/01 0700 12/01 0701 - 12/02 0700 12/02 0701 - 12/03 0700    P.O.    600    IV Piggyback   500     Total Intake(mL/kg)   500 (8.2) 600 (9.8)    Urine (mL/kg/hr)   300 100 (0.1)    Stool    0    Total Output   300 100    Net   +200 +500            Stool Unmeasured Occurrence    1 x        Lines, Drains & Airways    Active LDAs     Name:   Placement date:   Placement time:   Site:   Days:    Peripheral IV 12/01/19 2017 Left Antecubital   12/01/19 2017    Antecubital   less than 1                  Physical Exam:    Physical Exam   Constitutional: She is oriented to person, place, and time. She appears well-developed and well-nourished. No distress.   HENT:   Head: Normocephalic and atraumatic.   Mouth/Throat: Oropharynx is clear and moist.   Eyes: Conjunctivae and EOM are normal. Pupils are equal, round, and reactive to light. No scleral icterus.   Neck: Normal range of motion. Neck supple. No thyromegaly present.   Cardiovascular: Normal " rate, regular rhythm and normal heart sounds.   No murmur heard.  Pulmonary/Chest: Effort normal and breath sounds normal. No respiratory distress. She has no wheezes. She has no rales.   Abdominal: Soft. Bowel sounds are normal. She exhibits no distension. There is no tenderness.   Musculoskeletal: Normal range of motion. She exhibits no edema.   Lymphadenopathy:     She has no cervical adenopathy.   Neurological: She is alert and oriented to person, place, and time. No sensory deficit.   Skin: Skin is warm and dry. No rash noted. She is not diaphoretic. No erythema.   Psychiatric: She has a normal mood and affect. Her behavior is normal.         Procedures:              Results Review:     I reviewed the patient's new clinical results.      Lab Results (last 24 hours)     Procedure Component Value Units Date/Time    Hemoglobin A1c [[id number]]  (Normal) Collected:  12/02/19 0441    Specimen:  Blood Updated:  12/02/19 1539     Hemoglobin A1C 4.7 %     Narrative:       Hemoglobin A1C Reference Range:    <5.7 %        Normal  5.7-6.4 %     Increased risk for diabetes  > 6.4 %        Diabetes       These guidelines have been recommended by the American Diabetic Association for Hgb A1c.      The following 2010 guidelines have been recommended by the American Diabetes Association for Hemoglobin A1c.    HBA1c 5.7-6.4% Increased risk for future diabetes (pre-diabetes)  HBA1c     >6.4% Diabetes    Respiratory Panel, PCR - Swab, Nasopharynx [[id number]]  (Normal) Collected:  12/02/19 0558    Specimen:  Swab from Nasopharynx Updated:  12/02/19 0842     ADENOVIRUS, PCR Not Detected     Coronavirus 229E Not Detected     Coronavirus HKU1 Not Detected     Coronavirus NL63 Not Detected     Coronavirus OC43 Not Detected     Human Metapneumovirus Not Detected     Human Rhinovirus/Enterovirus Not Detected     Influenza B PCR Not Detected     Parainfluenza Virus 1 Not Detected     Parainfluenza Virus 2 Not Detected     Parainfluenza  Virus 3 Not Detected     Parainfluenza Virus 4 Not Detected     Bordetella pertussis pcr Not Detected     Influenza A H1 2009 PCR Not Detected     Chlamydophila pneumoniae PCR Not Detected     Mycoplasma pneumo by PCR Not Detected     Influenza A PCR Not Detected     Influenza A H3 Not Detected     Influenza A H1 Not Detected     RSV, PCR Not Detected    Ferritin [[id number]]  (Normal) Collected:  12/02/19 0441    Specimen:  Blood Updated:  12/02/19 0540     Ferritin 145.40 ng/mL     Troponin [[id number]]  (Normal) Collected:  12/02/19 0441    Specimen:  Blood Updated:  12/02/19 0540     Troponin T 0.029 ng/mL     Narrative:       Troponin T Reference Range:  <= 0.03 ng/mL-   Negative for AMI  >0.03 ng/mL-     Abnormal for myocardial necrosis.  Clinicians would have to utilize clinical acumen, EKG, Troponin and serial changes to determine if it is an Acute Myocardial Infarction or myocardial injury due to an underlying chronic condition.     Comprehensive Metabolic Panel [[id number]]  (Abnormal) Collected:  12/02/19 0441    Specimen:  Blood Updated:  12/02/19 0540     Glucose 74 mg/dL      BUN 28 mg/dL      Creatinine 1.41 mg/dL      Sodium 140 mmol/L      Potassium 4.1 mmol/L      Chloride 111 mmol/L      CO2 22.0 mmol/L      Calcium 9.1 mg/dL      Total Protein 6.4 g/dL      Albumin 3.10 g/dL      ALT (SGPT) 8 U/L      AST (SGOT) 26 U/L      Alkaline Phosphatase 59 U/L      Total Bilirubin 0.2 mg/dL      eGFR Non African Amer 34 mL/min/1.73      Globulin 3.3 gm/dL      A/G Ratio 0.9 g/dL      BUN/Creatinine Ratio 19.9     Anion Gap 7.0 mmol/L     Narrative:       GFR Normal >60  Chronic Kidney Disease <60  Kidney Failure <15    CK [[id number]]  (Normal) Collected:  12/02/19 0441    Specimen:  Blood Updated:  12/02/19 0537     Creatine Kinase 37 U/L     Lipid Panel [[id number]]  (Abnormal) Collected:  12/02/19 0441    Specimen:  Blood Updated:  12/02/19 0537     Total Cholesterol 145 mg/dL      Triglycerides 84 mg/dL       HDL Cholesterol 32 mg/dL      LDL Cholesterol  96 mg/dL      VLDL Cholesterol 16.8 mg/dL      LDL/HDL Ratio 3.01    Narrative:       Cholesterol Reference Ranges  (U.S. Department of Health and Human Services ATP III Classifications)    Desirable          <200 mg/dL  Borderline High    200-239 mg/dL  High Risk          >240 mg/dL      Triglyceride Reference Ranges  (U.S. Department of Health and Human Services ATP III Classifications)    Normal           <150 mg/dL  Borderline High  150-199 mg/dL  High             200-499 mg/dL  Very High        >500 mg/dL    HDL Reference Ranges  (U.S. Department of Health and Human Services ATP III Classifcations)    Low     <40 mg/dl (major risk factor for CHD)  High    >60 mg/dl ('negative' risk factor for CHD)        LDL Reference Ranges  (U.S. Department of Health and Human Services ATP III Classifcations)    Optimal          <100 mg/dL  Near Optimal     100-129 mg/dL  Borderline High  130-159 mg/dL  High             160-189 mg/dL  Very High        >189 mg/dL    Magnesium [[id number]]  (Normal) Collected:  12/02/19 0441    Specimen:  Blood Updated:  12/02/19 0537     Magnesium 2.0 mg/dL     Phosphorus [[id number]]  (Normal) Collected:  12/02/19 0441    Specimen:  Blood Updated:  12/02/19 0537     Phosphorus 3.3 mg/dL     CBC Auto Differential [[id number]]  (Abnormal) Collected:  12/02/19 0441    Specimen:  Blood Updated:  12/02/19 0503     WBC 3.00 10*3/mm3      RBC 3.33 10*6/mm3      Hemoglobin 9.8 g/dL      Hematocrit 29.7 %      MCV 89.3 fL      MCH 29.5 pg      MCHC 33.0 g/dL      RDW 14.7 %      RDW-SD 46.8 fl      MPV 8.7 fL      Platelets 143 10*3/mm3      Neutrophil % 69.2 %      Lymphocyte % 22.1 %      Monocyte % 7.6 %      Eosinophil % 0.7 %      Basophil % 0.4 %      Neutrophils, Absolute 2.10 10*3/mm3      Lymphocytes, Absolute 0.70 10*3/mm3      Monocytes, Absolute 0.20 10*3/mm3      Eosinophils, Absolute 0.00 10*3/mm3      Basophils, Absolute 0.00 10*3/mm3       "nRBC 0.0 /100 WBC     Procalcitonin [[id number]]  (Normal) Collected:  12/01/19 1954    Specimen:  Blood Updated:  12/02/19 0007     Procalcitonin 0.11 ng/mL     Narrative:       As a Marker for Sepsis (Non-Neonates):   1. <0.5 ng/mL represents a low risk of severe sepsis and/or septic shock.  1. >2 ng/mL represents a high risk of severe sepsis and/or septic shock.    As a Marker for Lower Respiratory Tract Infections that require antibiotic therapy:  PCT on Admission     Antibiotic Therapy             6-12 Hrs later  > 0.5                Strongly Recommended            >0.25 - <0.5         Recommended  0.1 - 0.25           Discouraged                   Remeasure/reassess PCT  <0.1                 Strongly Discouraged          Remeasure/reassess PCT      As 28 day mortality risk marker: \"Change in Procalcitonin Result\" (> 80 % or <=80 %) if Day 0 (or Day 1) and Day 4 values are available. Refer to http://[email or web address]ellybeanpct-calculator.com/   Change in PCT <=80 %   A decrease of PCT levels below or equal to 80 % defines a positive change in PCT test result representing a higher risk for 28-day all-cause mortality of patients diagnosed with severe sepsis or septic shock.  Change in PCT > 80 %   A decrease of PCT levels of more than 80 % defines a negative change in PCT result representing a lower risk for 28-day all-cause mortality of patients diagnosed with severe sepsis or septic shock.                TSH [[id number]]  (Abnormal) Collected:  12/01/19 1954    Specimen:  Blood Updated:  12/02/19 0006     TSH 0.213 uIU/mL     BNP [[id number]]  (Abnormal) Collected:  12/01/19 1954    Specimen:  Blood Updated:  12/02/19 0006     proBNP 8,959.0 pg/mL     Narrative:       Among patients with dyspnea, NT-proBNP is highly sensitive for the detection of acute congestive heart failure. In addition NT-proBNP of <300 pg/ml effectively rules out acute congestive heart failure with 99% negative predictive value.    Influenza Antigen, " Rapid - Swab, Nasopharynx [[id number]]  (Normal) Collected:  12/01/19 2020    Specimen:  Swab from Nasopharynx Updated:  12/01/19 2042     Influenza A PCR Not Detected     Influenza B PCR Not Detected    Urinalysis With Culture If Indicated - Urine, Catheter [[id number]]  (Abnormal) Collected:  12/01/19 2018    Specimen:  Urine, Catheter Updated:  12/01/19 2028     Color, UA Yellow     Appearance, UA Clear     pH, UA 7.0     Specific Gravity, UA 1.017     Glucose, UA Negative     Ketones, UA Trace     Bilirubin, UA Negative     Blood, UA Small (1+)     Protein, UA 30 mg/dL (1+)     Leuk Esterase, UA Negative     Nitrite, UA Negative     Urobilinogen, UA 0.2 E.U./dL    Urinalysis, Microscopic Only - Urine, Catheter [[id number]]  (Abnormal) Collected:  12/01/19 2018    Specimen:  Urine, Catheter Updated:  12/01/19 2028     RBC, UA 6-12 /HPF      WBC, UA None Seen /HPF      Bacteria, UA None Seen /HPF      Squamous Epithelial Cells, UA None Seen /HPF      Hyaline Casts, UA 0-2 /LPF      Methodology Automated Microscopy    Troponin [[id number]]  (Abnormal) Collected:  12/01/19 1954    Specimen:  Blood Updated:  12/01/19 2025     Troponin T 0.034 ng/mL     Narrative:       Troponin T Reference Range:  <= 0.03 ng/mL-   Negative for AMI  >0.03 ng/mL-     Abnormal for myocardial necrosis.  Clinicians would have to utilize clinical acumen, EKG, Troponin and serial changes to determine if it is an Acute Myocardial Infarction or myocardial injury due to an underlying chronic condition.     Comprehensive Metabolic Panel [[id number]]  (Abnormal) Collected:  12/01/19 1954    Specimen:  Blood Updated:  12/01/19 2023     Glucose 84 mg/dL      BUN 30 mg/dL      Creatinine 1.51 mg/dL      Sodium 138 mmol/L      Potassium 4.6 mmol/L      Chloride 106 mmol/L      CO2 18.0 mmol/L      Calcium 9.7 mg/dL      Total Protein 7.7 g/dL      Albumin 3.70 g/dL      ALT (SGPT) 9 U/L      AST (SGOT) 30 U/L      Alkaline Phosphatase 72 U/L      Total  Bilirubin 0.5 mg/dL      eGFR Non African Amer 32 mL/min/1.73      Globulin 4.0 gm/dL      A/G Ratio 0.9 g/dL      BUN/Creatinine Ratio 19.9     Anion Gap 14.0 mmol/L     Narrative:       GFR Normal >60  Chronic Kidney Disease <60  Kidney Failure <15    Lipase [[id number]]  (Normal) Collected:  12/01/19 1954    Specimen:  Blood Updated:  12/01/19 2023     Lipase 16 U/L     Blood Culture - Blood, Arm, Right [[id number]] Collected:  12/01/19 1959    Specimen:  Blood from Arm, Right Updated:  12/01/19 2002    POC Lactate [[id number]]  (Normal) Collected:  12/01/19 1958    Specimen:  Blood Updated:  12/01/19 1959     Lactate 1.8 mmol/L      Comment: Serial Number: [id number]:  [id number]       CBC & Differential [[id number]] Collected:  12/01/19 1953    Specimen:  Blood Updated:  12/01/19 1959    Narrative:       The following orders were created for panel order CBC & Differential.  Procedure                               Abnormality         Status                     ---------                               -----------         ------                     CBC Auto Differential[[id number]]        Abnormal            Final result                 Please view results for these tests on the individual orders.    CBC Auto Differential [[id number]]  (Abnormal) Collected:  12/01/19 1953    Specimen:  Blood Updated:  12/01/19 1959     WBC 4.00 10*3/mm3      RBC 3.78 10*6/mm3      Hemoglobin 11.0 g/dL      Hematocrit 33.1 %      MCV 87.7 fL      MCH 29.1 pg      MCHC 33.2 g/dL      RDW 14.3 %      RDW-SD 45.1 fl      MPV 8.8 fL      Platelets 175 10*3/mm3      Neutrophil % 75.4 %      Lymphocyte % 19.8 %      Monocyte % 4.3 %      Eosinophil % 0.2 %      Basophil % 0.3 %      Neutrophils, Absolute 3.00 10*3/mm3      Lymphocytes, Absolute 0.80 10*3/mm3      Monocytes, Absolute 0.20 10*3/mm3      Eosinophils, Absolute 0.00 10*3/mm3      Basophils, Absolute 0.00 10*3/mm3      nRBC 0.0 /100 WBC     Blood Culture - Blood, Arm, Left  [[id number]] Collected:  12/01/19 1953    Specimen:  Blood from Arm, Left Updated:  12/01/19 1956        Hemoglobin A1C   Date Value Ref Range Status   12/02/2019 4.7 3.5 - 5.6 % Final                   Microbiology Results (last 10 days)     Procedure Component Value - Date/Time    Respiratory Panel, PCR - Swab, Nasopharynx [[id number]]  (Normal) Collected:  12/02/19 0558    Lab Status:  Final result Specimen:  Swab from Nasopharynx Updated:  12/02/19 0842     ADENOVIRUS, PCR Not Detected     Coronavirus 229E Not Detected     Coronavirus HKU1 Not Detected     Coronavirus NL63 Not Detected     Coronavirus OC43 Not Detected     Human Metapneumovirus Not Detected     Human Rhinovirus/Enterovirus Not Detected     Influenza B PCR Not Detected     Parainfluenza Virus 1 Not Detected     Parainfluenza Virus 2 Not Detected     Parainfluenza Virus 3 Not Detected     Parainfluenza Virus 4 Not Detected     Bordetella pertussis pcr Not Detected     Influenza A H1 2009 PCR Not Detected     Chlamydophila pneumoniae PCR Not Detected     Mycoplasma pneumo by PCR Not Detected     Influenza A PCR Not Detected     Influenza A H3 Not Detected     Influenza A H1 Not Detected     RSV, PCR Not Detected    Influenza Antigen, Rapid - Swab, Nasopharynx [[id number]]  (Normal) Collected:  12/01/19 2020    Lab Status:  Final result Specimen:  Swab from Nasopharynx Updated:  12/01/19 2042     Influenza A PCR Not Detected     Influenza B PCR Not Detected          ECG/EMG Results (most recent)     Procedure Component Value Units Date/Time    ECG 12 Lead [[id number]] Collected:  12/01/19 1942     Updated:  12/02/19 0749    Narrative:       HEART RATE= 75  bpm  RR Interval= 804  ms  MN Interval= 162  ms  P Horizontal Axis= -62  deg  P Front Axis= 80  deg  QRSD Interval= 140  ms  QT Interval= 445  ms  QRS Axis= -59  deg  T Wave Axis= -5  deg  - BORDERLINE ECG -  Sinus rhythm  Probable left atrial enlargement  When compared with ECG of 16-Sep-2015  15:16:21,  No significant change  Electronically Signed By: Momo Melgoza ([facility]) 02-Dec-2019 07:49:39  Date and Time of Study: 2019-12-01 19:42:15                    Ct Head Without Contrast    Result Date: 12/1/2019   1.  No acute intracranial process detected. 2.  Volume loss and mild microvascular ischemic changes.   Last Mcintyre MD Neuroradiologist Thank you for this referral.  This exam was interpreted by a fellowship trained neuroradiologist.   SLOT:  [id number]  Electronically signed by:  Last Mcintyre  12/1/2019 10:01 PM    Xr Chest 1 View    Result Date: 12/2/2019   1. Chronic change about the chest 2. No active disease 3. Interval improvement from 09/16/2015  Electronically Signed By-Javier Rashid Jr. On:12/2/2019 7:07 AM This report was finalized on [id number] by  Javier Rashid Jr., .      Xrays, labs reviewed personally by physician.    Medication Review:   I have reviewed the patient's current medication list      Scheduled Meds    aspirin 81 mg Oral Daily   cefTRIAXone 1 g Intravenous Q24H   enoxaparin 30 mg Subcutaneous Q24H   levothyroxine 75 mcg Oral Q AM   nicotine 1 patch Transdermal Q24H   NIFEdipine XL 30 mg Oral Daily   sodium chloride 10 mL Intravenous Q12H       Meds Infusions       Meds PRN  •  acetaminophen **OR** acetaminophen **OR** acetaminophen  •  Calcium Gluconate-NaCl **AND** calcium gluconate **AND** Calcium, Ionized  •  docusate sodium  •  influenza vaccine  •  ketamine (KETALAR) infusion **AND** Ketamine Vital Signs & Assessment  •  magnesium sulfate **OR** magnesium sulfate **OR** magnesium sulfate  •  melatonin  •  nitroglycerin  •  ondansetron **OR** ondansetron  •  potassium chloride  •  potassium chloride  •  [COMPLETED] Insert peripheral IV **AND** sodium chloride  •  sodium chloride    I personally reviewed patient's x-ray films and my findings are concurrent with radiology's interpretation.    Assessment/Plan   Assessment/Plan     Active Hospital Problems:  Acute kidney  injury (CMS/HCC)    - elevated creatinine of 1.51 with a BUN of 30 now creat 1.4   - BNP 8959.0. May need some gentle diuresis   - Pt denies any changes in urine output  - Avoid nephrotoxic medication and contrast  - monitor while admitted     Essential hypertension- (present on admission)     Patient's blood pressure moderately controlled with a current pressure of 154/78  -Continue nifedipine  -Monitor while admitted     Hypothyroidism (acquired)- (present on admission)    Patient has a history of hypothyroidism along with a change in mental status  -TSH 0.213, some acute decrease may be due to acute illness  - Continue levothyroxine  - Plan to repeat in 4-6 weeks with PCP             Resolved Hospital Problems:  Confusion-resolved as of 12/3/2019, (present on admission)    Patient reported by EMS crew who brought patient to the hospital as having an acute change in mental status.  They state son notes patient is typically A&O x1 and lives independently.  They state symptoms have occurred over the past 24 hours.  -CT head without contrast  -Troponin noted is elevated, trend  -UA and chest x-ray negative as a source of infection  -Check TSH and A1c  -Continue cardiac monitoring     Fever-resolved as of 12/3/2019, (present on admission)     Patient noted in the ED of having a fever of 100.2, WBCs: 4.0  -Rocephin given in the ED for fever of unknown origin  -Lactate: 1.8  -Procalcitonin 0.11  -all work-up negative to date  -no recurrence of elevated temp since admit; d/c antibiotics and observe overnight               VTE Prophylaxis - SCDs.      Code Status -   Code Status and Medical Interventions:   Ordered at: 12/01/19 6742     Level Of Support Discussed With:    Patient     Code Status:    CPR     Medical Interventions (Level of Support Prior to Arrest):    Full       Discharge Planning    Destination      No service coordination in this encounter.      Durable Medical Equipment      No service coordination in  this encounter.      Dialysis/Infusion      No service coordination in this encounter.      Home Medical Care      No service coordination in this encounter.      Therapy      No service coordination in this encounter.      Community Resources      No service coordination in this encounter.            Electronically signed by Lisa Ritchie MD, 12/02/19, 6:39 PM.  [facility] Hospitalist Team

## 2019-12-02 NOTE — ED NOTES
"Pt arrived via EMS for generalized weakness, pt reports her stomach has been bothering her but she doesn't know how long, pt states \"I'm too tired to answer all of your questions\", when asked pt states \"I don't care what year it is and I don't care who the president is\", pt has no other complaints at this time     Farrah Goyal, RN  12/01/19 1935    "

## 2019-12-02 NOTE — ED PROVIDER NOTES
"Subjective   History of Present Illness  Weakness, nausea  89-year-old female complains of generalized weakness and nausea for an undisclosed amount of time.  She states she is too tired to answer any questions.  She states she does not know how long she is been feeling poorly.  She denies pain.  She is specifically denies chest or abdominal pain and vomiting and diarrhea or dysuria.  Otherwise patient is a poor historian and unable to give any further details of history and review of systems.  Review of Systems   Unable to perform ROS: Other       No past medical history on file.    No Known Allergies    No past surgical history on file.    No family history on file.    Social History     Socioeconomic History   • Marital status:      Spouse name: Not on file   • Number of children: Not on file   • Years of education: Not on file   • Highest education level: Not on file           Objective   Physical Exam   /90   Pulse 76   Temp 100.2 °F (37.9 °C) (Rectal)   Resp 19   Ht 157.5 cm (62\")   Wt 59 kg (130 lb)   SpO2 100%   BMI 23.78 kg/m²   General: Thin elderly female in no acute distress, awake and alert  Eyes: Pupils round and reactive, sclera nonicteric  HEENT: Mucous membranes dry, no mucosal swelling  Neck: Supple, no nuchal rigidity, no lymphadenopathy  Respirations: Respirations nonlabored, equal breath sounds bilaterally, clear lungs  Heart regular rate and rhythm, no murmurs rubs or gallops,   Abdomen soft nontender nondistended, no hepatosplenomegaly, no hernia, no mass, normal bowel sounds, no CVA tenderness  Extremities no clubbing cyanosis or edema, calves are symmetric and nontender  Neuro cranial nerves grossly intact, no focal limb deficits  Psych oriented to person and place, cooperative  Skin no rash, brisk cap refill  Procedures           ED Course    EKG sinus rhythm, rate of 75, right bundle branch block    Results for orders placed or performed during the hospital encounter " of 12/01/19   Influenza Antigen, Rapid - Swab, Nasopharynx   Result Value Ref Range    Influenza A PCR Not Detected Not Detected    Influenza B PCR Not Detected Not Detected   Comprehensive Metabolic Panel   Result Value Ref Range    Glucose 84 65 - 99 mg/dL    BUN 30 (H) 8 - 23 mg/dL    Creatinine 1.51 (H) 0.57 - 1.00 mg/dL    Sodium 138 136 - 145 mmol/L    Potassium 4.6 3.5 - 5.2 mmol/L    Chloride 106 98 - 107 mmol/L    CO2 18.0 (L) 22.0 - 29.0 mmol/L    Calcium 9.7 (H) 8.2 - 9.6 mg/dL    Total Protein 7.7 6.0 - 8.5 g/dL    Albumin 3.70 3.50 - 5.20 g/dL    ALT (SGPT) 9 1 - 33 U/L    AST (SGOT) 30 1 - 32 U/L    Alkaline Phosphatase 72 39 - 117 U/L    Total Bilirubin 0.5 0.2 - 1.2 mg/dL    eGFR Non African Amer 32 (L) >60 mL/min/1.73    Globulin 4.0 gm/dL    A/G Ratio 0.9 g/dL    BUN/Creatinine Ratio 19.9 7.0 - 25.0    Anion Gap 14.0 5.0 - 15.0 mmol/L   Lipase   Result Value Ref Range    Lipase 16 13 - 60 U/L   Troponin   Result Value Ref Range    Troponin T 0.034 (C) 0.000 - 0.030 ng/mL   Urinalysis With Culture If Indicated - Urine, Catheter   Result Value Ref Range    Color, UA Yellow Yellow, Straw    Appearance, UA Clear Clear    pH, UA 7.0 5.0 - 8.0    Specific Gravity, UA 1.017 1.005 - 1.030    Glucose, UA Negative Negative    Ketones, UA Trace (A) Negative    Bilirubin, UA Negative Negative    Blood, UA Small (1+) (A) Negative    Protein, UA 30 mg/dL (1+) (A) Negative    Leuk Esterase, UA Negative Negative    Nitrite, UA Negative Negative    Urobilinogen, UA 0.2 E.U./dL 0.2 - 1.0 E.U./dL   CBC Auto Differential   Result Value Ref Range    WBC 4.00 3.40 - 10.80 10*3/mm3    RBC 3.78 3.77 - 5.28 10*6/mm3    Hemoglobin 11.0 (L) 12.0 - 15.9 g/dL    Hematocrit 33.1 (L) 34.0 - 46.6 %    MCV 87.7 79.0 - 97.0 fL    MCH 29.1 26.6 - 33.0 pg    MCHC 33.2 31.5 - 35.7 g/dL    RDW 14.3 12.3 - 15.4 %    RDW-SD 45.1 37.0 - 54.0 fl    MPV 8.8 6.0 - 12.0 fL    Platelets 175 140 - 450 10*3/mm3    Neutrophil % 75.4 42.7 - 76.0 %     Lymphocyte % 19.8 19.6 - 45.3 %    Monocyte % 4.3 (L) 5.0 - 12.0 %    Eosinophil % 0.2 (L) 0.3 - 6.2 %    Basophil % 0.3 0.0 - 1.5 %    Neutrophils, Absolute 3.00 1.70 - 7.00 10*3/mm3    Lymphocytes, Absolute 0.80 0.70 - 3.10 10*3/mm3    Monocytes, Absolute 0.20 0.10 - 0.90 10*3/mm3    Eosinophils, Absolute 0.00 0.00 - 0.40 10*3/mm3    Basophils, Absolute 0.00 0.00 - 0.20 10*3/mm3    nRBC 0.0 0.0 - 0.2 /100 WBC   Urinalysis, Microscopic Only - Urine, Catheter   Result Value Ref Range    RBC, UA 6-12 (A) None Seen /HPF    WBC, UA None Seen None Seen /HPF    Bacteria, UA None Seen None Seen /HPF    Squamous Epithelial Cells, UA None Seen None Seen, 0-2 /HPF    Hyaline Casts, UA 0-2 None Seen /LPF    Methodology Automated Microscopy    POC Lactate   Result Value Ref Range    Lactate 1.8 0.5 - 2.0 mmol/L     Chest x-ray shows no apparent acute cardiopulmonary process pending radiology report          MDM  Patient presents with low-grade fever and weakness.  She did not screen positive for sepsis.  Her lactic acid was normal.  Source of the fever was not clearly identified.  She does have a benign abdominal examination with no signs of peritonitis or acute abdomen.  She has no nuchal rigidity to suggest meningitis.  X-ray shows no definite infiltrate.  The urinalysis shows no bacteria.  Blood cultures are pending she was ordered a dose of Rocephin in the emergency room course.  She was given some IV fluids as she does appear somewhat dehydrated.  Hospitalist service was paged and patient will be admitted for further evaluation.  She did have a borderline elevated troponin and an indeterminate range with an EKG that was not significantly changed from previous and denies chest pain or shortness of breath.  Final diagnoses:   Fever, unspecified fever cause   Weakness              Momo Melgoza MD  12/01/19 6224

## 2019-12-02 NOTE — ASSESSMENT & PLAN NOTE
- elevated creatinine of 1.51 with a BUN of 30 now creat 1.4   - BNP 8959.0. May need some gentle diuresis   - Pt denies any changes in urine output  - Avoid nephrotoxic medication and contrast  - monitor while admitted

## 2019-12-02 NOTE — THERAPY EVALUATION
Acute Care - Occupational Therapy Initial Evaluation   Kris     Patient Name: Mariana Justice  : 1919  MRN: [id number]  Today's Date: 2019             Admit Date: 2019       ICD-10-CM ICD-9-CM   1. Fever, unspecified fever cause R50.9 780.60   2. Weakness R53.1 780.79     Patient Active Problem List   Diagnosis   • Fever   • Confusion   • Hypothyroidism (acquired)   • Essential hypertension   • Acute kidney injury (CMS/HCC)     History reviewed. No pertinent past medical history.  History reviewed. No pertinent surgical history.       OT ASSESSMENT FLOWSHEET (last 12 hours)      Occupational Therapy Evaluation     Row Name 19 1400                   OT Evaluation Time/Intention    Subjective Information  complains of;weakness  -AL        Document Type  evaluation  -AL        Mode of Treatment  occupational therapy  -AL        Patient Effort  good  -AL           General Information    Patient Profile Reviewed?  yes  -AL        Prior Level of Function  independent:;all household mobility;min assist:;ADL's son a with shoes/socks, supervision for tub transfers  -AL        Equipment Currently Used at Home  rollator son does all home mgt, driving, son home   -AL        Pertinent History of Current Functional Problem  89 y/o F admitted with AMS, possible UTI. further workup in progress. CT head (-) for acute changes.   -AL        Existing Precautions/Restrictions  fall severe vision deficits  -AL        Limitations/Impairments  visual;hearing  -AL           Relationship/Environment    Primary Source of Support/Comfort  child(bud)  -AL        Lives With  child(bud), adult  -AL        Family Caregiver if Needed  child(bud), adult available   -AL           Resource/Environmental Concerns    Current Living Arrangements  home/apartment/condo [facility]  -AL        Resource/Environmental Concerns  --  -AL           Home Main Entrance    Number of Stairs, Main Entrance  two  -AL            Cognitive Assessment/Intervention- PT/OT    Orientation Status (Cognition)  person;place;situation  -AL        Follows Commands (Cognition)  WFL  -AL        Safety Deficit (Cognitive)  mild deficit  -AL           Safety Issues, Functional Mobility    Safety Issues Affecting Function (Mobility)  insight into deficits/self awareness  -AL        Impairments Affecting Function (Mobility)  balance;cognition;endurance/activity tolerance;visual/perceptual;strength  -AL        Comment, Safety Issues/Impairments (Mobility)  severe visual deficits. 0/5 trials correct with near vision and distance (6 inches to 2 ft from pt). unable to id # digits held up on any trial  -AL           Bed Mobility Assessment/Treatment    Bed Mobility Assessment/Treatment  supine-sit;sit-supine  -AL        Supine-Sit [place] (Bed Mobility)  minimum assist (75% patient effort)  -AL        Sit-Supine [place] (Bed Mobility)  minimum assist (75% patient effort)  -AL        Assistive Device (Bed Mobility)  bed rails  -AL           Functional Mobility    Functional Mobility- Comment  pt stood at bedside w/ cga-min a, constant min a for static balance and minimal weight shifting L<>R w/ pt holding to OT's forearms. has persistent posterior lob  -AL           Transfer Assessment/Treatment    Transfer Assessment/Treatment  sit-stand transfer;stand-sit transfer;toilet transfer  -AL           Sit-Stand Transfer    Sit-Stand [place] (Transfers)  minimum assist (75% patient effort);verbal cues  -AL           Stand-Sit Transfer    Stand-Sit [place] (Transfers)  minimum assist (75% patient effort);verbal cues  -AL           Toilet Transfer    [place] Level (Toilet Transfer)  minimum assist (75% patient effort);verbal cues  -AL        Assistive Device (Toilet Transfer)  commode, 3-in-1  -AL           ADL Assessment/Intervention    BADL Assessment/Intervention  upper body dressing;lower body dressing;toileting  -AL           Upper Body  Dressing Assessment/Training    Upper Body Dressing [place] Level  upper body dressing skills;set up;supervision  -AL           Lower Body Dressing Assessment/Training    Lower Body Dressing [place] Level  lower body dressing skills;moderate assist (50% patient effort)  -AL           Toileting Assessment/Training    [place] Level (Toileting)  toileting skills;moderate assist (50% patient effort)  -AL           BADL Safety/Performance    Impairments, BADL Safety/Performance  balance;cognition;endurance/activity tolerance;strength;visual/perceptual  -AL           General ROM    GENERAL ROM COMMENTS  ue's wfl  -AL           MMT (Manual Muscle Testing)    General MMT Comments  r ue 4-/5; l ue 3+ to 4-/5  -AL           Motor Assessment/Interventions    Additional Documentation  -- finger to nose intact bilat  -AL           Positioning and Restraints    Pre-Treatment Position  in bed  -AL        Post Treatment Position  bed  -AL        In Bed  notified nsg;side lying right;call light within reach;encouraged to call for assist;exit alarm on  -AL           Pain Assessment    Additional Documentation  Pain Scale: Numbers Pre/Post-Treatment (Group)  -AL           Pain Scale: Numbers Pre/Post-Treatment    Pain Scale: Numbers, Pretreatment  0/10 - no pain  -AL        Pain Scale: Numbers, Post-Treatment  0/10 - no pain  -AL           Plan of Care Review    Plan of Care Reviewed With  patient  -AL           Clinical Impression (OT)    Patient/Family Goals Statement (OT Eval)  would like to return home w/ HH therapies  -AL        Criteria for Skilled Therapeutic Interventions Met (OT Eval)  yes;treatment indicated  -AL        Rehab Potential (OT Eval)  good, to achieve stated therapy goals  -AL        Therapy Frequency (OT Eval)  5 times/wk  -AL        Patient/Family Concerns, Equipment Needs at Discharge (OT)  -- may need tub transfer bench  -AL        Anticipated Discharge Disposition (OT)  home with home  health;home with 24/7 care  -AL           Planned OT Interventions    Planned Therapy Interventions (OT Eval)  BADL retraining;functional balance retraining;transfer/mobility retraining  -AL        BADL Retraining  lbd w/ min a; lbb/toileting with supervision  -AL        Functional Balance Retraining  tolerate 3-5 min standing activity with sba, ae/ad prn  -AL        Transfer/Mobility Retraining  toilet/shower transfers w/ sba, ae/ad prn  -AL           Living Environment    Home Accessibility  stairs to enter home;tub/shower is not walk in  -AL          User Key  (r) = Recorded By, (t) = Taken By, (c) = Cosigned By    Initials Name Effective Dates    AL Blanca Townsend, OT 03/01/19 -                OT Recommendation and Plan  Outcome Summary/Treatment Plan (OT)  Patient/Family Concerns, Equipment Needs at Discharge (OT): (may need tub transfer bench)  Anticipated Discharge Disposition (OT): home with home health, home with 24/7 care  Planned Therapy Interventions (OT Eval): BADL retraining, functional balance retraining, transfer/mobility retraining  Therapy Frequency (OT Eval): 5 times/wk  Plan of Care Review  Plan of Care Reviewed With: patient  Plan of Care Reviewed With: patient  Outcome Summary: pt alert and oriented to person, place and situation today. she does show some increased weakness and balance issues, requiring up to mod a for lb adls and min a for basic transfers/mobility. pt also has severe vision deficits, combined w/ current decline, she is at an increased risk for falls/injury. pt would benefit from a short ip rehab stay but states she wants to go home w/ some HH therapy and reports her son can assist her as needed 24/7. if pt returns home, would recommend a tub transfer bench. will follow 5x/wk during stay at [facility].        Time Calculation:   Time Calculation- OT     Row Name 12/02/19 3947             Time Calculation- OT    OT Start Time  1405  -AL      OT Stop Time  1425  -AL      OT Time  Calculation (min)  20 min  -AL      Total Timed Code Minutes- OT  9 minute(s)  -AL      OT Received On  12/02/19  -AL      OT - Next Appointment  12/03/19  -AL      OT Goal Re-Cert Due Date  12/16/19  -AL        User Key  (r) = Recorded By, (t) = Taken By, (c) = Cosigned By    Initials Name Provider Type    AL Blanca Townsend, OT Occupational Therapist                 Blanca Townsend OT  12/2/2019

## 2019-12-03 VITALS
DIASTOLIC BLOOD PRESSURE: 67 MMHG | RESPIRATION RATE: 18 BRPM | OXYGEN SATURATION: 93 % | TEMPERATURE: 98.2 F | WEIGHT: 137.13 LBS | SYSTOLIC BLOOD PRESSURE: 118 MMHG | HEIGHT: 62 IN | HEART RATE: 84 BPM | BODY MASS INDEX: 25.23 KG/M2

## 2019-12-03 PROBLEM — R50.9 FEVER: Status: RESOLVED | Noted: 2019-12-01 | Resolved: 2019-12-03

## 2019-12-03 PROBLEM — R41.0 CONFUSION: Status: RESOLVED | Noted: 2019-12-01 | Resolved: 2019-12-03

## 2019-12-03 LAB — POTASSIUM BLD-SCNC: 3.7 MMOL/L (ref 3.5–5.2)

## 2019-12-03 PROCEDURE — 84132 ASSAY OF SERUM POTASSIUM: CPT | Performed by: PHYSICIAN ASSISTANT

## 2019-12-03 PROCEDURE — 99217 PR OBSERVATION CARE DISCHARGE MANAGEMENT: CPT | Performed by: INTERNAL MEDICINE

## 2019-12-03 PROCEDURE — G0378 HOSPITAL OBSERVATION PER HR: HCPCS

## 2019-12-03 RX ADMIN — ASPIRIN 81 MG: 81 TABLET, DELAYED RELEASE ORAL at 08:00

## 2019-12-03 RX ADMIN — Medication 10 ML: at 08:01

## 2019-12-03 RX ADMIN — LEVOTHYROXINE SODIUM 75 MCG: 75 TABLET ORAL at 05:14

## 2019-12-03 RX ADMIN — NIFEDIPINE 30 MG: 30 TABLET, FILM COATED, EXTENDED RELEASE ORAL at 08:00

## 2019-12-03 NOTE — PLAN OF CARE
Problem: Patient Care Overview  Goal: Plan of Care Review  Outcome: Ongoing (interventions implemented as appropriate)   12/03/19 0240   Coping/Psychosocial   Plan of Care Reviewed With patient   Plan of Care Review   Progress improving   OTHER   Outcome Summary No c/o pain, possible d/c today, will continue to monitor

## 2019-12-03 NOTE — DISCHARGE SUMMARY
[facility] Medicine Services  DISCHARGE SUMMARY        Prepared For PCP:  Richard Cabello MD    Patient Name: Mariana Justice  : 1919  MRN: [id number]      Date of Admission:   2019    Date of Discharge:  12/3/2019    Length of stay:  LOS: 0 days     Hospital Course     Presenting Problem:   Weakness [R53.1]  Fever, unspecified fever cause [R50.9]    Active Hospital Problems:  Acute kidney injury (CMS/HCC)- (present on admission)    - elevated creatinine of 1.51 with a BUN of 30 now creat 1.4   - BNP 8959.0. May need some gentle diuresis   - Pt denies any changes in urine output  - Avoid nephrotoxic medication and contrast  - monitor while admitted     Essential hypertension- (present on admission)     Patient's blood pressure moderately controlled with a current pressure of 154/78  -Continue nifedipine  -Monitor while admitted     Hypothyroidism (acquired)- (present on admission)    Patient has a history of hypothyroidism along with a change in mental status  -TSH 0.213, some acute decrease may be due to acute illness  - Continue levothyroxine  - Plan to repeat in 4-6 weeks with PCP         Resolved Hospital Problems:  Confusion-resolved as of 12/3/2019, (present on admission)    Patient reported by EMS crew who brought patient to the hospital as having an acute change in mental status.  They state son notes patient is typically A&O x1 and lives independently.  They state symptoms have occurred over the past 24 hours.  -CT head without contrast  -Troponin noted is elevated, trend  -UA and chest x-ray negative as a source of infection  -Check TSH and A1c  -Continue cardiac monitoring     Fever-resolved as of 12/3/2019, (present on admission)     Patient noted in the ED of having a fever of 100.2, WBCs: 4.0  -Rocephin given in the ED for fever of unknown origin  -Lactate: 1.8  -Procalcitonin 0.11  -all work-up negative to date  -no recurrence of elevated temp since admit; d/c  antibiotics and observe overnight           Hospital Course:  Mariana Justice is a 89 y.o. female past medical history of hypothyroidism and hypertension who presents to [facility] with confusion.  At time of my exam patient is alert and oriented to person and place however she is not able to provide any significant ROS, or HPI.  When asked because she is feeling she says fine, denies any pain, shortness of breath, nausea, vomiting.  RN states at various times since her visit she has said both her stomach and her back hurt.  Phone number on file for patient's son who is her POA w was not working.  Also unable to contact son via phone number provided by the 911 dispatch center who took the call for service from the ambulance.  I was able to speak with the EMTs who transported patient to the hospital they state patient's son called them because patient had become acutely confused today.  They note he was afraid that his mother had a UTI and wanted her seen at the hospital.     Patient was noted with a temperature of 100.2 on admission.  UA shows trace ketones, 1+ blood, negative nitrites or leukocyte esterase, 1+ protein, 6-12 RBCs, no bacteria and no squamous epithelial cells.  The UA did not reflex for culture and no urinary tract infection as present.  Influenza screen was negative.  Troponin: 0.034, lactate: 1.8, lipase: 16 chest x-ray reported by the ED provider is negative.    EKG shows sinus rhythm at 75 with an IVCD consistent with a right bundle branch block and a slightly increased QTC.    The patient's confusion resolved quickly.  Blood cultures were negative, she was negative for influenza and her respiratory panel was also negative.  Serum creatinine actually improved over the course of her admission.  The elevated creatinine could explain the elevation in her troponin level.  In the absence of UTI and no definitive bacterial pathogen found, antibiotics were discontinued.  The patient had no  further fevers present after admission either.  She was discharged home in stable condition.            Recommendation for Outpatient Providers:     Follow up on renal function and hydration status, [place]. If patient reports ongoing issues with diarrhea.      Reasons For Change In Medications and Indications for New Medications:        Day of Discharge     HPI: No new complaints.  Patient is eager to go home.      Vital Signs:   Temp:  [97.5 °F (36.4 °C)-98.2 °F (36.8 °C)] 98.2 °F (36.8 °C)  Heart Rate:  [] 84  Resp:  [16-18] 18  BP: (100-152)/(60-69) 118/67     Physical Exam:  General: well-developed and well-nourished, NAD  HEENT: NC/AT, EOMI, PERRLA, edentulous  Heart: RRR. No murmur   Chest: CTAB, no w/r/r, normal respiratory effort  Abdominal: Soft. NT/ND. Bowel sounds present  Musculoskeletal: Normal ROM.  No edema. No calf tenderness.  Neurological: AAOx3, no focal deficits  Skin: Skin is warm and dry. No rash  Psychiatric: Normal mood and affect.    Pertinent  and/or Most Recent Results     Results from last 7 days   Lab Units 12/03/19  0426 12/02/19 0441 12/01/19 1954 12/01/19 1953   WBC 10*3/mm3  --  3.00*  --  4.00   HEMOGLOBIN g/dL  --  9.8*  --  11.0*   HEMATOCRIT %  --  29.7*  --  33.1*   PLATELETS 10*3/mm3  --  143  --  175   SODIUM mmol/L  --  140 138  --    POTASSIUM mmol/L 3.7 4.1 4.6  --    CHLORIDE mmol/L  --  111* 106  --    CO2 mmol/L  --  22.0 18.0*  --    BUN mg/dL  --  28* 30*  --    CREATININE mg/dL  --  1.41* 1.51*  --    GLUCOSE mg/dL  --  74 84  --    CALCIUM mg/dL  --  9.1 9.7*  --      Results from last 7 days   Lab Units 12/02/19 0441 12/01/19 1954   BILIRUBIN mg/dL 0.2 0.5   ALK PHOS U/L 59 72   ALT (SGPT) U/L 8 9   AST (SGOT) U/L 26 30     Results from last 7 days   Lab Units 12/02/19  0441   CHOLESTEROL mg/dL 145   TRIGLYCERIDES mg/dL 84   HDL CHOL mg/dL 32*     Results from last 7 days   Lab Units 12/02/19 0441 12/01/19 1958 12/01/19 1954   TSH uIU/mL  --   --  0.213*    HEMOGLOBIN A1C % 4.7  --   --    PROBNP pg/mL  --   --  8,959.0*   TROPONIN T ng/mL 0.029  --  0.034*   PROCALCITONIN ng/mL  --   --  0.11   LACTATE mmol/L  --  1.8  --        Brief Urine Lab Results  (Last result in the past 365 days)      Color   Clarity   Blood   Leuk Est   Nitrite   Protein   CREAT   Urine HCG        12/01/19 2018 Yellow Clear Small (1+) Negative Negative 30 mg/dL (1+)               Microbiology Results Abnormal     Procedure Component Value - Date/Time    Blood Culture - Blood, Arm, Right [[id number]] Collected:  12/01/19 1959    Lab Status:  Preliminary result Specimen:  Blood from Arm, Right Updated:  12/02/19 2024     Blood Culture No growth at 24 hours    Blood Culture - Blood, Arm, Left [[id number]] Collected:  12/01/19 1953    Lab Status:  Preliminary result Specimen:  Blood from Arm, Left Updated:  12/02/19 2000     Blood Culture No growth at 24 hours    Respiratory Panel, PCR - Swab, Nasopharynx [[id number]]  (Normal) Collected:  12/02/19 0558    Lab Status:  Final result Specimen:  Swab from Nasopharynx Updated:  12/02/19 0842     ADENOVIRUS, PCR Not Detected     Coronavirus 229E Not Detected     Coronavirus HKU1 Not Detected     Coronavirus NL63 Not Detected     Coronavirus OC43 Not Detected     Human Metapneumovirus Not Detected     Human Rhinovirus/Enterovirus Not Detected     Influenza B PCR Not Detected     Parainfluenza Virus 1 Not Detected     Parainfluenza Virus 2 Not Detected     Parainfluenza Virus 3 Not Detected     Parainfluenza Virus 4 Not Detected     Bordetella pertussis pcr Not Detected     Influenza A H1 2009 PCR Not Detected     Chlamydophila pneumoniae PCR Not Detected     Mycoplasma pneumo by PCR Not Detected     Influenza A PCR Not Detected     Influenza A H3 Not Detected     Influenza A H1 Not Detected     RSV, PCR Not Detected    Influenza Antigen, Rapid - Swab, Nasopharynx [[id number]]  (Normal) Collected:  12/01/19 2020    Lab Status:  Final result Specimen:   Swab from Nasopharynx Updated:  12/01/19 2042     Influenza A PCR Not Detected     Influenza B PCR Not Detected          Ct Head Without Contrast    Result Date: 12/1/2019  Impression:  1.  No acute intracranial process detected. 2.  Volume loss and mild microvascular ischemic changes.   Last Mcintyre MD Neuroradiologist Thank you for this referral.  This exam was interpreted by a fellowship trained neuroradiologist.   SLOT:  [id number]  Electronically signed by:  Last Mcintyre  12/1/2019 10:01 PM    Xr Chest 1 View    Result Date: 12/2/2019  Impression:  1. Chronic change about the chest 2. No active disease 3. Interval improvement from 09/16/2015  Electronically Signed By-Javier Rashid Jr. On:12/2/2019 7:07 AM This report was finalized on [id number] by  Javier Rashid Jr., .                             Test Results Pending at Discharge   Order Current Status    Blood Culture - Blood, Arm, Left Preliminary result    Blood Culture - Blood, Arm, Right Preliminary result            Procedures Performed           Consults:   Consults     Date and Time Order Name Status Description    12/1/2019 2046 Hospitalist (on-call MD unless specified) Completed             Discharge Details        Discharge Medications      Continue These Medications      Instructions Start Date   aspirin 81 MG EC tablet   81 mg, Oral, Daily      levothyroxine 75 MCG tablet  Commonly known as:  SYNTHROID, LEVOTHROID   75 mcg, Oral, Daily      NIFEdipine XL 30 MG 24 hr tablet  Commonly known as:  PROCARDIA XL   30 mg, Oral, Daily             No Known Allergies      Discharge Disposition:  Home or Self Care    Diet:  Hospital:  Diet Order   Procedures   • Diet Regular         Discharge Activity:   Activity Instructions     Activity as Tolerated              CODE STATUS:    Code Status and Medical Interventions:   Ordered at: 12/01/19 1057     Level Of Support Discussed With:    Patient     Code Status:    CPR     Medical Interventions (Level of Support  Prior to Arrest):    Full         Follow-up Appointments  No future appointments.    Additional Instructions for the Follow-ups that You Need to Schedule     Call MD With Problems / Concerns   As directed      Instructions: Call [phone] or email [email or web address] for problems or concerns.    Order Comments:  Instructions: Call [phone] or email [email or web address] for problems or concerns.          Discharge Follow-up with PCP   As directed       Currently Documented PCP:    Richard Cabello MD    PCP Phone Number:    [phone]     Follow Up Details:  one week                 Condition on Discharge:      Stable      Electronically signed by Lisa Ritchie MD, 12/03/19, 11:07 AM.    Time: I spent 20 minutes on this discharge activity which included face-to-face encounter with the patient/reviewing the data in the system/coordination of the care with the nursing staff as well as consultants/documentation/entering orders.

## 2019-12-03 NOTE — PLAN OF CARE
Problem: Patient Care Overview  Goal: Plan of Care Review  Outcome: Ongoing (interventions implemented as appropriate)    Goal: Individualization and Mutuality  Outcome: Ongoing (interventions implemented as appropriate)      Problem: Fall Risk (Adult)  Goal: Identify Related Risk Factors and Signs and Symptoms  Outcome: Ongoing (interventions implemented as appropriate)      Problem: Skin Injury Risk (Adult)  Goal: Identify Related Risk Factors and Signs and Symptoms  Outcome: Ongoing (interventions implemented as appropriate)    Goal: Skin Health and Integrity  Outcome: Ongoing (interventions implemented as appropriate)      Problem: Pain, Chronic (Adult)  Goal: Identify Related Risk Factors and Signs and Symptoms  Outcome: Ongoing (interventions implemented as appropriate)      Problem: Activity Intolerance (Adult)  Goal: Identify Related Risk Factors and Signs and Symptoms  Outcome: Ongoing (interventions implemented as appropriate)    Goal: Activity Tolerance  Outcome: Ongoing (interventions implemented as appropriate)    Goal: Effective Energy Conservation Techniques  Outcome: Ongoing (interventions implemented as appropriate)      Problem: Infection, Risk/Actual (Adult)  Goal: Identify Related Risk Factors and Signs and Symptoms  Outcome: Ongoing (interventions implemented as appropriate)    Goal: Infection Prevention/Resolution  Outcome: Ongoing (interventions implemented as appropriate)

## 2019-12-06 LAB
BACTERIA SPEC AEROBE CULT: NORMAL
BACTERIA SPEC AEROBE CULT: NORMAL